# Patient Record
Sex: FEMALE | Race: WHITE | Employment: FULL TIME | URBAN - METROPOLITAN AREA
[De-identification: names, ages, dates, MRNs, and addresses within clinical notes are randomized per-mention and may not be internally consistent; named-entity substitution may affect disease eponyms.]

---

## 2018-02-21 PROCEDURE — 88175 CYTOPATH C/V AUTO FLUID REDO: CPT | Performed by: OBSTETRICS & GYNECOLOGY

## 2018-02-21 PROCEDURE — 87624 HPV HI-RISK TYP POOLED RSLT: CPT | Performed by: OBSTETRICS & GYNECOLOGY

## 2018-10-05 PROCEDURE — 86480 TB TEST CELL IMMUN MEASURE: CPT | Performed by: FAMILY MEDICINE

## 2018-10-06 PROBLEM — E78.49 OTHER HYPERLIPIDEMIA: Status: ACTIVE | Noted: 2018-10-06

## 2019-03-19 PROCEDURE — 88175 CYTOPATH C/V AUTO FLUID REDO: CPT | Performed by: OBSTETRICS & GYNECOLOGY

## 2019-04-19 PROCEDURE — 86900 BLOOD TYPING SEROLOGIC ABO: CPT | Performed by: OBSTETRICS & GYNECOLOGY

## 2019-04-19 PROCEDURE — 88305 TISSUE EXAM BY PATHOLOGIST: CPT | Performed by: OBSTETRICS & GYNECOLOGY

## 2019-04-19 PROCEDURE — 86901 BLOOD TYPING SEROLOGIC RH(D): CPT | Performed by: OBSTETRICS & GYNECOLOGY

## 2019-08-14 PROBLEM — Z34.90: Status: ACTIVE | Noted: 2019-08-14

## 2019-08-14 PROBLEM — Z34.90 ENCOUNTER FOR SUPERVISION OF NORMAL PREGNANCY, ANTEPARTUM, UNSPECIFIED GRAVIDITY: Status: ACTIVE | Noted: 2019-08-14

## 2019-08-14 PROCEDURE — 87086 URINE CULTURE/COLONY COUNT: CPT | Performed by: OBSTETRICS & GYNECOLOGY

## 2019-08-14 PROCEDURE — 86850 RBC ANTIBODY SCREEN: CPT | Performed by: OBSTETRICS & GYNECOLOGY

## 2019-08-14 PROCEDURE — 86901 BLOOD TYPING SEROLOGIC RH(D): CPT | Performed by: OBSTETRICS & GYNECOLOGY

## 2019-08-14 PROCEDURE — 86900 BLOOD TYPING SEROLOGIC ABO: CPT | Performed by: OBSTETRICS & GYNECOLOGY

## 2019-09-11 PROCEDURE — 81241 F5 GENE: CPT | Performed by: OBSTETRICS & GYNECOLOGY

## 2019-09-11 PROCEDURE — 85705 THROMBOPLASTIN INHIBITION: CPT | Performed by: OBSTETRICS & GYNECOLOGY

## 2019-09-11 PROCEDURE — 85610 PROTHROMBIN TIME: CPT | Performed by: OBSTETRICS & GYNECOLOGY

## 2019-09-11 PROCEDURE — 85305 CLOT INHIBIT PROT S TOTAL: CPT | Performed by: OBSTETRICS & GYNECOLOGY

## 2019-09-11 PROCEDURE — 85302 CLOT INHIBIT PROT C ANTIGEN: CPT | Performed by: OBSTETRICS & GYNECOLOGY

## 2019-09-11 PROCEDURE — 36415 COLL VENOUS BLD VENIPUNCTURE: CPT | Performed by: OBSTETRICS & GYNECOLOGY

## 2019-09-11 PROCEDURE — 81291 MTHFR GENE: CPT | Performed by: OBSTETRICS & GYNECOLOGY

## 2019-09-11 PROCEDURE — 85732 THROMBOPLASTIN TIME PARTIAL: CPT | Performed by: OBSTETRICS & GYNECOLOGY

## 2019-09-11 PROCEDURE — 85306 CLOT INHIBIT PROT S FREE: CPT | Performed by: OBSTETRICS & GYNECOLOGY

## 2019-09-11 PROCEDURE — 85613 RUSSELL VIPER VENOM DILUTED: CPT | Performed by: OBSTETRICS & GYNECOLOGY

## 2019-09-11 PROCEDURE — G0452 MOLECULAR PATHOLOGY INTERPR: HCPCS | Performed by: OBSTETRICS & GYNECOLOGY

## 2019-10-04 ENCOUNTER — TELEPHONE (OUTPATIENT)
Dept: PERINATAL CARE | Facility: HOSPITAL | Age: 29
End: 2019-10-04

## 2019-10-04 NOTE — TELEPHONE ENCOUNTER
Pt  Consult 10/16/19 for MTHFR pos  And recent loss  preconcep consult  Pt questions about appt answered

## 2019-10-16 ENCOUNTER — HOSPITAL ENCOUNTER (OUTPATIENT)
Dept: PERINATAL CARE | Facility: HOSPITAL | Age: 29
Discharge: HOME | End: 2019-10-16
Attending: OBSTETRICS & GYNECOLOGY
Payer: COMMERCIAL

## 2019-10-16 VITALS — WEIGHT: 202 LBS | DIASTOLIC BLOOD PRESSURE: 90 MMHG | SYSTOLIC BLOOD PRESSURE: 153 MMHG | BODY MASS INDEX: 35 KG/M2

## 2019-10-16 DIAGNOSIS — Z15.89 MTHFR GENE MUTATION: ICD-10-CM

## 2019-10-16 DIAGNOSIS — N96 RECURRENT PREGNANCY LOSS: ICD-10-CM

## 2019-10-16 PROCEDURE — 99243 OFF/OP CNSLTJ NEW/EST LOW 30: CPT | Performed by: OBSTETRICS & GYNECOLOGY

## 2019-10-16 NOTE — PROGRESS NOTES
Reason for Consult:   Dear Dr. Sanchez Pham,    Thank you for requesting preconceptual maternal fetal medicine consultation on Ladan Okeefe. As you are aware she is a 30year old female.   A maternal-fetal medicine consultation was requested secondary Other (brain aneurysm) Maternal Grandmother    • Diabetes Maternal Grandfather    • Breast Cancer Paternal Grandmother    • Psychiatric Mother         depression,lexapro  fibromyalgia   • High Cholesterol Mother    • Cancer Other         colon   • Cancer O obesity, smoking, alcohol use, and caffeine consumption is unclear. Some infections, such as Listeria monocytogenes, Toxoplasma gondii, cytomegalovirus, and primary genital herpes, are known to cause sporadic pregnancy loss.        It is recommended to ev screening for aneuploidy, level II ultrasound 20 weeks, serial growth ultrasounds, and antepartum fetal monitoring with weekly NST starting at 32-34 weeks. IMPRESSION:   1. MTHFR  2.  Recurrent pregnancy loss    RECOMMENDATIONS:   1.  start Aspiri

## 2020-11-04 PROBLEM — O24.410 GDM, CLASS A1 (HCC): Status: ACTIVE | Noted: 2020-11-04

## 2020-11-04 PROBLEM — O24.410 GDM, CLASS A1: Status: ACTIVE | Noted: 2020-11-04

## 2025-06-19 ENCOUNTER — TELEPHONE (OUTPATIENT)
Age: 35
End: 2025-06-19

## 2025-08-09 ENCOUNTER — WALK IN (OUTPATIENT)
Dept: URGENT CARE | Age: 35
End: 2025-08-09

## 2025-08-09 VITALS
OXYGEN SATURATION: 98 % | RESPIRATION RATE: 16 BRPM | TEMPERATURE: 98.7 F | HEART RATE: 98 BPM | SYSTOLIC BLOOD PRESSURE: 114 MMHG | DIASTOLIC BLOOD PRESSURE: 79 MMHG

## 2025-08-09 DIAGNOSIS — H60.331 ACUTE SWIMMER'S EAR OF RIGHT SIDE: ICD-10-CM

## 2025-08-09 DIAGNOSIS — H61.23 BILATERAL HEARING LOSS DUE TO CERUMEN IMPACTION: Primary | ICD-10-CM

## 2025-08-09 RX ORDER — NEOMYCIN SULFATE, POLYMYXIN B SULFATE AND HYDROCORTISONE 3.5; 10000; 1 MG/ML; [IU]/ML; MG/ML
3 SOLUTION AURICULAR (OTIC) 4 TIMES DAILY
Qty: 10 ML | Refills: 0 | Status: SHIPPED | OUTPATIENT
Start: 2025-08-09 | End: 2025-08-14

## 2025-08-09 RX ORDER — ALPRAZOLAM 0.25 MG
.25-.5 TABLET ORAL NIGHTLY PRN
COMMUNITY
Start: 2025-06-04

## 2025-08-09 ASSESSMENT — ENCOUNTER SYMPTOMS
ABDOMINAL PAIN: 0
CHILLS: 0
AGITATION: 0
APPETITE CHANGE: 0
VOMITING: 0
RECTAL PAIN: 0
HEADACHES: 0
ABDOMINAL DISTENTION: 0
FEVER: 0
DIZZINESS: 0
EYE REDNESS: 0
POLYPHAGIA: 0
NUMBNESS: 0
COUGH: 0
SINUS PAIN: 0
EYE DISCHARGE: 0
HALLUCINATIONS: 0
SORE THROAT: 0
COLOR CHANGE: 0
VOICE CHANGE: 0
SPEECH DIFFICULTY: 0
NAUSEA: 0
SHORTNESS OF BREATH: 0
BACK PAIN: 0
CHEST TIGHTNESS: 0
DIARRHEA: 0